# Patient Record
Sex: MALE | Race: OTHER | URBAN - METROPOLITAN AREA
[De-identification: names, ages, dates, MRNs, and addresses within clinical notes are randomized per-mention and may not be internally consistent; named-entity substitution may affect disease eponyms.]

---

## 2020-01-09 ENCOUNTER — EMERGENCY (EMERGENCY)
Facility: HOSPITAL | Age: 20
LOS: 0 days | Discharge: HOME | End: 2020-01-09
Attending: EMERGENCY MEDICINE | Admitting: EMERGENCY MEDICINE
Payer: MEDICAID

## 2020-01-09 VITALS
WEIGHT: 156.97 LBS | HEART RATE: 63 BPM | SYSTOLIC BLOOD PRESSURE: 125 MMHG | DIASTOLIC BLOOD PRESSURE: 77 MMHG | TEMPERATURE: 98 F | RESPIRATION RATE: 20 BRPM | OXYGEN SATURATION: 99 %

## 2020-01-09 DIAGNOSIS — R21 RASH AND OTHER NONSPECIFIC SKIN ERUPTION: ICD-10-CM

## 2020-01-09 PROCEDURE — 99283 EMERGENCY DEPT VISIT LOW MDM: CPT

## 2020-01-09 RX ORDER — DEXAMETHASONE 0.5 MG/5ML
10 ELIXIR ORAL ONCE
Refills: 0 | Status: COMPLETED | OUTPATIENT
Start: 2020-01-09 | End: 2020-01-09

## 2020-01-09 RX ORDER — DIPHENHYDRAMINE HCL 50 MG
25 CAPSULE ORAL ONCE
Refills: 0 | Status: DISCONTINUED | OUTPATIENT
Start: 2020-01-09 | End: 2020-01-09

## 2020-01-09 RX ORDER — DIPHENHYDRAMINE HCL 50 MG
50 CAPSULE ORAL ONCE
Refills: 0 | Status: COMPLETED | OUTPATIENT
Start: 2020-01-09 | End: 2020-01-09

## 2020-01-09 RX ADMIN — Medication 60 MILLIGRAM(S): at 01:16

## 2020-01-09 RX ADMIN — Medication 50 MILLIGRAM(S): at 01:16

## 2020-01-09 RX ADMIN — Medication 10 MILLIGRAM(S): at 01:16

## 2020-01-09 NOTE — ED PROVIDER NOTE - PATIENT PORTAL LINK FT
You can access the FollowMyHealth Patient Portal offered by [facility] by registering at the following website: [email or web address]. By joining [employer]’s FollowMyHealth portal, you will also be able to view your health information using other applications (apps) compatible with our system.

## 2020-01-09 NOTE — ED PEDIATRIC NURSE NOTE - NSIMPLEMENTINTERV_GEN_ALL_ED
Implemented All Universal Safety Interventions:  [place] to call system. Call bell, personal items and telephone within reach. Instruct patient to call for assistance. Room bathroom lighting operational. Non-slip footwear when patient is off stretcher. Physically safe environment: no spills, clutter or unnecessary equipment. Stretcher in lowest position, wheels locked, appropriate side rails in place.

## 2020-01-09 NOTE — ED PROVIDER NOTE - CLINICAL SUMMARY MEDICAL DECISION MAKING FREE TEXT BOX
rash - recent sore throat on amoxicillin x 7d, strep neg - benadryl/pepcid/steroids given, pt will stop amoxicillin, return precautions discussed, OP PMD/Allergist f/u

## 2020-01-09 NOTE — ED PROVIDER NOTE - NSFOLLOWUPINSTRUCTIONS_ED_ALL_ED_FT
PLEASE STOP TAKING YOUR AMOXICILLIN AS PREVIOUSLY PRESCRIBED.    Rash    A rash is a change in the color of the skin. A rash can also change the way your skin feels. There are many different conditions and factors that can cause a rash, most of which are not dangerous. Make sure to follow up with your primary care physician or a dermatologist as instructed by your health care provider.    SEEK IMMEDIATE MEDICAL CARE IF YOU HAVE THE FOLLOWING SYMPTOMS: fever, blisters, a rash inside your mouth, or altered mental status.

## 2020-01-09 NOTE — ED PROVIDER NOTE - CARE PROVIDER_API CALL
PMD,   your PMD  Phone: (   )    -  Fax: (   )    -  Established Patient  Follow Up Time: 1-3 Days    Kalyn Painter)  Allergy and Immunology; Internal Medicine  [address] [place] [address]  Phone: [phone]  Fax: [phone]  Follow Up Time: 7-10 Days

## 2020-01-09 NOTE — ED PROVIDER NOTE - ATTENDING CONTRIBUTION TO CARE
19y m no pmh p/w rash x 2d. Papular rarh to back, chest & ext. Sore throat x 1 week - [facility] and given Rx for amoxicillin which he has been taking x 7d. Has taken in past w/o issue. Rpts 2 neg rapid strep test & 1 neg strep Cx since sx began. Sore throat has resolved. no f/c, diff swallowing or breathing, vocal changes, cp/sob, cough, nv, abd pain.    PE:  nad  papular rash to trunk & ext, no vesicles, no signs of cellulitis, no urticaria, otherwise warm, dry  ncat  op- tongue/pharynx nl, no erythema or edema, uvula midline, no stridor/drooling, phonating normally  neck supple  rrr nl s1s2 no mrg  ctab no wrr  abd soft ntnd no palpable masses no rgr  back non-tender no cvat  ext no cce dpi  neuro aaox3 grossly nf exam

## 2020-01-09 NOTE — ED PROVIDER NOTE - PROVIDER TOKENS
FREE:[LAST:[PMD],PHONE:[(   )    -],FAX:[(   )    -],ADDRESS:[your PMD],FOLLOWUP:[1-3 Days],ESTABLISHEDPATIENT:[T]],PROVIDER:[TOKEN:[[id number]],FOLLOWUP:[7-10 Days]]

## 2020-01-09 NOTE — ED PEDIATRIC TRIAGE NOTE - CHIEF COMPLAINT QUOTE
Patient complaining of rash on back and face starting this morning. +red, itching, burning. Patient reports taking antibiotics last week for throat infection.

## 2020-01-09 NOTE — ED PROVIDER NOTE - PHYSICAL EXAMINATION
VITAL SIGNS: I have reviewed nursing notes and confirm.  CONSTITUTIONAL: Well-developed; well-nourished; in no acute distress. pt comfortable.  SKIN: papular rash to trunk & ext, no vesicles, no signs of cellulitis, no urticaria  HEAD: Normocephalic; atraumatic.  EYES:  EOM intact; conjunctiva and sclera clear.  ENT: No nasal discharge; airway clear. moist oral mucosa; uvula at midline. no pharyngeal erythema, edema exudate or vesicles.  TMs with good light reflex b/l.    NECK: Supple; non tender.  CARD: S1, S2 normal; no murmurs, gallops, or rubs. Regular rate and rhythm. posterior tibial and radial pulses 2+  RESP: No wheezes, rales or rhonchi. cta b/l. no use of accessory muscles. no retractions  ABD: Normal bowel sounds; soft; non-distended; non-tender; no rebound. negative psoas, rovsign's and murphys.  EXT: Normal ROM. No  cyanosis or edema.  BACK: No cva tenderness  LYMPH: No acute cervical adenopathy.  NEURO: Alert, oriented, grossly unremarkable.    PSYCH: Cooperative, appropriate.

## 2020-01-09 NOTE — ED PEDIATRIC NURSE NOTE - CHPI ED NUR SYMPTOMS NEG
no scaly patches on skin/no confusion/no body aches/no pain/no petechia/no vomiting/no fever/no decreased eating/drinking

## 2020-01-09 NOTE — ED PROVIDER NOTE - OBJECTIVE STATEMENT
20y/o M w/ no pmh presents for rash that is itchy that started in the back and now going to chest, abdomen and extremities.  Pt on amoxicillin for the past 7 days for sore throat--pt had sore throat and has had 3 rapid tests, with 2 cultures (only knows results of 1 culture that was negative). pt denies any sore throat now. no cough, congestion, no fevers or chills. no new lotions, shampoos, no new exposures to anything. no vomiting or diarrhea. no travel hx. no one with this rash.  has not had this rash in the past.

## 2020-11-23 PROBLEM — Z00.00 ENCOUNTER FOR PREVENTIVE HEALTH EXAMINATION: Status: ACTIVE | Noted: 2020-11-23

## 2021-01-13 ENCOUNTER — APPOINTMENT (OUTPATIENT)
Dept: UROLOGY | Facility: CLINIC | Age: 21
End: 2021-01-13

## 2023-04-28 ENCOUNTER — EMERGENCY (EMERGENCY)
Facility: HOSPITAL | Age: 23
LOS: 0 days | Discharge: HOME | End: 2023-04-29
Attending: EMERGENCY MEDICINE
Payer: MEDICAID

## 2023-04-28 VITALS
HEART RATE: 76 BPM | OXYGEN SATURATION: 97 % | SYSTOLIC BLOOD PRESSURE: 142 MMHG | TEMPERATURE: 99 F | DIASTOLIC BLOOD PRESSURE: 82 MMHG | WEIGHT: 190.04 LBS | HEIGHT: 66 IN | RESPIRATION RATE: 18 BRPM

## 2023-04-28 DIAGNOSIS — R05.9 COUGH, UNSPECIFIED: ICD-10-CM

## 2023-04-28 DIAGNOSIS — J02.9 ACUTE PHARYNGITIS, UNSPECIFIED: ICD-10-CM

## 2023-04-28 DIAGNOSIS — Z88.0 ALLERGY STATUS TO PENICILLIN: ICD-10-CM

## 2023-04-28 DIAGNOSIS — R13.10 DYSPHAGIA, UNSPECIFIED: ICD-10-CM

## 2023-04-28 DIAGNOSIS — J06.9 ACUTE UPPER RESPIRATORY INFECTION, UNSPECIFIED: ICD-10-CM

## 2023-04-28 PROCEDURE — 87081 CULTURE SCREEN ONLY: CPT

## 2023-04-28 PROCEDURE — 99283 EMERGENCY DEPT VISIT LOW MDM: CPT

## 2023-04-28 PROCEDURE — 99284 EMERGENCY DEPT VISIT MOD MDM: CPT

## 2023-04-28 NOTE — ED ADULT TRIAGE NOTE - NS ED TRIAGE AVPU SCALE
We discussed the need to work on smoking cessation, regular exercise, healthy diet, weight management.  Follow-up in 6 months for blood pressure recheck.   Alert-The patient is alert, awake and responds to voice. The patient is oriented to time, place, and person. The triage nurse is able to obtain subjective information.

## 2023-04-28 NOTE — ED ADULT TRIAGE NOTE - CHIEF COMPLAINT QUOTE
" I have cough and sore throat x 6 days and it hurts when I cough." Currently on Clindamycin for the sore throat prescribed from [facility] [place]

## 2023-04-29 RX ORDER — IBUPROFEN 200 MG
600 TABLET ORAL ONCE
Refills: 0 | Status: COMPLETED | OUTPATIENT
Start: 2023-04-29 | End: 2023-04-29

## 2023-04-29 RX ORDER — DEXAMETHASONE 0.5 MG/5ML
10 ELIXIR ORAL ONCE
Refills: 0 | Status: COMPLETED | OUTPATIENT
Start: 2023-04-29 | End: 2023-04-29

## 2023-04-29 RX ADMIN — Medication 10 MILLIGRAM(S): at 01:18

## 2023-04-29 RX ADMIN — Medication 600 MILLIGRAM(S): at 01:29

## 2023-04-29 NOTE — ED PROVIDER NOTE - ATTENDING APP SHARED VISIT CONTRIBUTION OF CARE
22-year-old male presents for evaluation of sore throat with painful swallowing and cough for 1 week.  Patient started on clindamycin, day 4.  Presents for evaluation due to persistent cough and sore throat.  No fevers, chills, shortness of breath, chest pain, rash, N/V/D or abdominal pain.  Patient tolerating p.o. as usual.  No recent travel or known sick contacts.    VITAL SIGNS: noted  CONSTITUTIONAL: Well-developed; well-nourished; in no acute distress  HEAD: Normocephalic; atraumatic  EYES: PERRL, EOM intact; conjunctiva and sclera clear  ENT: No nasal discharge; TMs clear bilateral, MMM, oropharynx clear without tonsillar hypertrophy , mild erythema and scattered exudates noted, no drooling or trismus, phonating normally  NECK: Supple; non tender. No anterior cervical lymphadenopathy noted  CARD: S1, S2 normal; no murmurs, gallops, or rubs. Regular rate and rhythm  RESP: CTAB/L, no wheezes, rales or rhonchi  ABD: Normal bowel sounds; soft; non-distended; non-tender; no organomegaly. No CVA tenderness  EXT: Normal ROM. No calf tenderness or edema. Distal pulses intact  NEURO: Awake and alert, interactive. Grossly unremarkable. No focal deficits.  SKIN: Skin exam is warm and dry, no acute rash

## 2023-04-29 NOTE — ED ADULT NURSE NOTE - CHIEF COMPLAINT QUOTE
" I have cough and sore throat x 6 days and it hurts when I cough." Currently on Clindamycin for the sore throat prescribed from [facility] [place]

## 2023-04-29 NOTE — ED PROVIDER NOTE - PATIENT PORTAL LINK FT
You can access the FollowMyHealth Patient Portal offered by [facility] by registering at the following website: [email or web address]. By joining [employer]’s FollowMyHealth portal, you will also be able to view your health information using other applications (apps) compatible with our system.

## 2023-04-29 NOTE — ED PROVIDER NOTE - NSFOLLOWUPINSTRUCTIONS_ED_ALL_ED_FT
FOLLOW UP WITH YOUR DOCTOR THIS WEEK FOR REEVALUATION.      RETURN TO ED IMMEDIATELY WITH ANY WORSENING SYMPTOMS, CHEST PAIN, SHORTNESS OF BREATH, ABDOMINAL PAIN, FEVERS, WEAKNESS, DIZZINESS, PERSISTENT OR SEVERE HEADACHE OR ANY OTHER CONCERNS.     Viral Respiratory Infection    A viral respiratory infection is an illness that affects parts of the body used for breathing, like the lungs, nose, and throat. It is caused by a germ called a virus. Symptoms can include runny nose, coughing, sneezing, fatigue, body aches, sore throat, fever, or headache. Over the counter medicine can be used to manage the symptoms but the infection typically goes away on its own in 5 to 10 days.     SEEK IMMEDIATE MEDICAL CARE IF YOU HAVE ANY OF THE FOLLOWING SYMPTOMS: shortness of breath, chest pain, fever over 10 days, or lightheadedness/dizziness.     Pharyngitis    Pharyngitis is inflammation of your pharynx, which is typically caused by a viral or bacterial infection. Pharyngitis can be contagious and may spread from person to person through intimate contact, coughing, sneezing, or sharing personal items and utensils. Symptoms of pharyngitis may include sore throat, fever, headache, or swollen lymph nodes. If you are prescribed antibiotics, make sure you finish them even if you start to feel better. Gargle with salt water as often as every 1-2 hours to soothe your throat. Throat lozenges (if you are not at risk for choking) or sprays may be used to soothe your throat.    SEEK IMMEDIATE MEDICAL CARE IF YOU HAVE ANY OF THE FOLLOWING SYMPTOMS: neck stiffness, drooling, hoarseness or change in voice, inability to swallow liquids, vomiting, or trouble breathing.

## 2023-04-29 NOTE — ED PROVIDER NOTE - PHYSICAL EXAMINATION
Constitutional: Well developed, well nourished. NAD  Head: Normocephalic, atraumatic.  Eyes: PERRL, EOMI.  ENT: No nasal discharge. Mucous membranes dry. + mild redness to posterior pharynx  - no exudates  Neck: Supple. Painless ROM.  Cardiovascular: Regular rate and rhythm.   Pulmonary:  Lungs clear to auscultation bilaterally.   Abdominal: Soft. Nondistended. No rebound, guarding, rigidity.  Extremities. Pelvis stable. No lower extremity edema, symmetric calves.  Skin: No rashes, cyanosis.  Neuro: AAOx3. No focal neurological deficits.  Psych: Normal mood. Normal affect.

## 2023-04-29 NOTE — ED PROVIDER NOTE - OBJECTIVE STATEMENT
22 yold male to ED with no signif med hx c/o sore throat, difficulty swallowing, cough - productive x 1 weeks; seen at local [facility] and started on clinda(tyra on day 4); pt returns for persistent cough and sore throat; denies fever, chills, n/v, rash, neck pain, hoarse voice;

## 2023-04-29 NOTE — ED PROVIDER NOTE - NS ED ROS FT
Constitutional: No fever, chills.  Eyes: No visual changes.  ENT: No hearing changes.  Neck: No neck pain or stiffness.  Cardiovascular: No chest pain, palpitations, edema.  Pulmonary:  seehpi  Abdominal:  No nausea, vomiting, diarrhea.  : No dysuria, frequency.  Neuro: No headache, syncope, dizziness.  MS: No back pain. No calf pain/swelling.  Psych: No suicidal ideations.

## 2023-04-30 LAB
CULTURE RESULTS: SIGNIFICANT CHANGE UP
SPECIMEN SOURCE: SIGNIFICANT CHANGE UP

## 2024-04-12 NOTE — ED ADULT TRIAGE NOTE - SOURCE OF INFORMATION
The patient's goals for the shift include Good night's sleep    The clinical goals for the shift include Comfort and rest    Over the shift, the patient did not make progress toward the following goals. Barriers to progression include chronic illness. Recommendations to address these barriers include administer sleepin aid and provide a quiet environment.     Patient